# Patient Record
Sex: FEMALE | Race: WHITE | NOT HISPANIC OR LATINO
[De-identification: names, ages, dates, MRNs, and addresses within clinical notes are randomized per-mention and may not be internally consistent; named-entity substitution may affect disease eponyms.]

---

## 2022-11-08 PROBLEM — Z00.00 ENCOUNTER FOR PREVENTIVE HEALTH EXAMINATION: Status: ACTIVE | Noted: 2022-11-08

## 2022-11-09 ENCOUNTER — APPOINTMENT (OUTPATIENT)
Dept: OTOLARYNGOLOGY | Facility: CLINIC | Age: 42
End: 2022-11-09

## 2022-11-09 VITALS — HEIGHT: 64 IN | WEIGHT: 130 LBS | BODY MASS INDEX: 22.2 KG/M2

## 2022-11-09 DIAGNOSIS — Z86.39 PERSONAL HISTORY OF OTHER ENDOCRINE, NUTRITIONAL AND METABOLIC DISEASE: ICD-10-CM

## 2022-11-09 DIAGNOSIS — Z78.9 OTHER SPECIFIED HEALTH STATUS: ICD-10-CM

## 2022-11-09 DIAGNOSIS — H90.3 SENSORINEURAL HEARING LOSS, BILATERAL: ICD-10-CM

## 2022-11-09 DIAGNOSIS — Z80.9 FAMILY HISTORY OF MALIGNANT NEOPLASM, UNSPECIFIED: ICD-10-CM

## 2022-11-09 DIAGNOSIS — M26.609 UNSPECIFIED TEMPOROMANDIBULAR JOINT DISORDER: ICD-10-CM

## 2022-11-09 DIAGNOSIS — H69.81 OTHER SPECIFIED DISORDERS OF EUSTACHIAN TUBE, RIGHT EAR: ICD-10-CM

## 2022-11-09 DIAGNOSIS — J06.9 ACUTE UPPER RESPIRATORY INFECTION, UNSPECIFIED: ICD-10-CM

## 2022-11-09 PROCEDURE — 92567 TYMPANOMETRY: CPT

## 2022-11-09 PROCEDURE — 92557 COMPREHENSIVE HEARING TEST: CPT

## 2022-11-09 PROCEDURE — 31231 NASAL ENDOSCOPY DX: CPT

## 2022-11-09 PROCEDURE — 99203 OFFICE O/P NEW LOW 30 MIN: CPT | Mod: 25

## 2022-11-09 NOTE — ASSESSMENT
[FreeTextEntry1] : She has had intermittent right ear fullness and pressure since late 2019 or early 2020 after an upper respiratory tract infection.  It did improve after about 1 year.  She has had recurrent or worsening symptoms since she was sick recently.  She had scant cerumen bilaterally which was removed.  Audiogram showed a mild/borderline high-frequency sensorineural hearing loss.  She had normal tympanograms.  Nasal endoscopy does show some stringy yellowish mucus throughout.  A culture was taken to rule out bacterial infection.  I discussed possible etiologies for ear pressure including eustachian tube dysfunction, allergies/sinus disease, reflux, TMJ dysfunction, use of Q-tips, atypical migraines, and rarely lesions.  She had a recent upper respiratory tract infection.  She has been feeling better although I discussed the possibility of a sinus infection\par \par Plan\par -Findings and management options were discussed with the patient.\par -Good ear hygiene reviewed.  She should avoid using cotton swabs in the ears\par -Noise precautions\par -Repeat audiogram in 1 year\par -I recommended a trial of nasal saline rinses, nasal steroid spray, and antihistamine or decongestant as needed.\par -We discussed placing her on antibiotics and possibly steroids.  She would like to hold off on that for now.  We will see what the culture shows.\par -I recommended treatment for TMJ dysfunction\par -I recommended that she not fly if she has a sinus infection as she could have worsening symptoms or possibly complications.  If she must fly, I recommended a decongestant before the flight and Afrin before takeoff and landing.  Her ears should be okay since the middle ear pressures were normal.\par -I have asked her to call me if she has any concerns or worsening symptoms.  Otherwise, I would like to see her back in 6 weeks if her symptoms do not resolve.  We discussed possible imaging studies.  We discussed MRI versus CT scan.  We will see if her symptoms improve over the next few weeks.  I told her to call me either way in 6 weeks or come in if she is not better

## 2022-11-09 NOTE — HISTORY OF PRESENT ILLNESS
[de-identified] : BROOKLYNN BARTH is a 42 year old patient with a history of right ear pressure and sensation of eustachian tube dysfunction since late 2019 or early 2020.  She had a bad upper respiratory tract infection at that time.  It did improve after about a year.  However, she became sick about 10 days ago and she also flew.  She feels like it has gotten worse again.  She has no otalgia, otorrhea, tinnitus, or dizziness.  She had no history of recurrent middle ear infections, prior otologic surgery, ear trauma, or excessive noise exposure.  There is a family history of possible age-related hearing loss.  She does get occasional sinus infections.  She has a little bit of nasal congestion but is otherwise doing well from a sinus standpoint.  She took Allegra which she felt dried her out.  She has been using a topical nitrous oxide spray to help prevent COVID.  She likely has TMJ dysfunction.  Her dentist has told her this.  She does not have a nightguard.  She had an MRI of the brain in the past after she fainted after I believe a COVID-vaccine.  It is about 2 years ago.  She is flying again tomorrow or in the near future

## 2022-11-16 ENCOUNTER — NON-APPOINTMENT (OUTPATIENT)
Age: 42
End: 2022-11-16

## 2022-11-16 LAB — EAR NOSE AND THROAT CULTURE: ABNORMAL

## 2022-12-27 ENCOUNTER — APPOINTMENT (OUTPATIENT)
Dept: OTOLARYNGOLOGY | Facility: CLINIC | Age: 42
End: 2022-12-27